# Patient Record
Sex: MALE | Race: WHITE | Employment: UNEMPLOYED | URBAN - METROPOLITAN AREA
[De-identification: names, ages, dates, MRNs, and addresses within clinical notes are randomized per-mention and may not be internally consistent; named-entity substitution may affect disease eponyms.]

---

## 2021-03-25 ENCOUNTER — IMMUNIZATION (OUTPATIENT)
Dept: PRIMARY CARE CLINIC | Age: 26
End: 2021-03-25
Payer: COMMERCIAL

## 2021-03-25 PROCEDURE — 91300 COVID-19, PFIZER VACCINE 30MCG/0.3ML DOSE: CPT | Performed by: PHYSICIAN ASSISTANT

## 2021-03-25 PROCEDURE — 0001A COVID-19, PFIZER VACCINE 30MCG/0.3ML DOSE: CPT | Performed by: PHYSICIAN ASSISTANT

## 2021-04-16 ENCOUNTER — IMMUNIZATION (OUTPATIENT)
Dept: PRIMARY CARE CLINIC | Age: 26
End: 2021-04-16
Payer: COMMERCIAL

## 2021-04-16 PROCEDURE — 91300 COVID-19, PFIZER VACCINE 30MCG/0.3ML DOSE: CPT | Performed by: INTERNAL MEDICINE

## 2021-04-16 PROCEDURE — 0002A COVID-19, PFIZER VACCINE 30MCG/0.3ML DOSE: CPT | Performed by: INTERNAL MEDICINE

## 2022-02-07 ENCOUNTER — HOSPITAL ENCOUNTER (OUTPATIENT)
Age: 27
Discharge: HOME | End: 2022-02-09
Payer: COMMERCIAL

## 2022-02-07 ENCOUNTER — HOSPITAL ENCOUNTER (OUTPATIENT)
Dept: GENERAL RADIOLOGY | Age: 27
Discharge: HOME | End: 2022-02-09
Payer: COMMERCIAL

## 2022-02-07 DIAGNOSIS — M25.511 ACUTE PAIN OF RIGHT SHOULDER: ICD-10-CM

## 2022-02-07 PROCEDURE — 73030 X-RAY EXAM OF SHOULDER: CPT

## 2022-04-06 ENCOUNTER — OFFICE VISIT (OUTPATIENT)
Dept: ORTHOPEDIC SURGERY | Age: 27
End: 2022-04-06
Payer: COMMERCIAL

## 2022-04-06 VITALS — BODY MASS INDEX: 25.76 KG/M2 | WEIGHT: 170 LBS | HEIGHT: 68 IN

## 2022-04-06 DIAGNOSIS — S43.431A GLENOID LABRAL TEAR, RIGHT, INITIAL ENCOUNTER: Primary | ICD-10-CM

## 2022-04-06 PROCEDURE — G8427 DOCREV CUR MEDS BY ELIG CLIN: HCPCS | Performed by: ORTHOPAEDIC SURGERY

## 2022-04-06 PROCEDURE — 1036F TOBACCO NON-USER: CPT | Performed by: ORTHOPAEDIC SURGERY

## 2022-04-06 PROCEDURE — G8419 CALC BMI OUT NRM PARAM NOF/U: HCPCS | Performed by: ORTHOPAEDIC SURGERY

## 2022-04-06 PROCEDURE — 99203 OFFICE O/P NEW LOW 30 MIN: CPT | Performed by: ORTHOPAEDIC SURGERY

## 2022-04-06 NOTE — PROGRESS NOTES
Chief Complaint   Patient presents with    Shoulder Pain         HPI:    Patient is 32 y.o. male complaining of right shoulder pain and weakness for about 5 months. He admits to a specific traumatic injury to the right shoulder. He felt a pop while doing some heavy shovelling. Previous treatments include rest, ice, and anti-inflammatory medication and about 2 months of formal physical therapy without much relief. He denies any other orthopedic complaints. ROS:    Skin: (-) rash,(-) psoriasis,(-) eczema, (-)skin cancer. Neurologic: (-)numbness, (-)tingling, (-)headaches, (-) LOC. Cardiovascular: (-) Chest pain, (-) swelling in legs/feet, (-) SOB, (-) cramping in legs/feet with walking. All other review of systems negative except stated above or in HPI      Past Medical History:   Diagnosis Date    Anal fissure 2015    not correct diagnosis    Anxiety     in medical setting,     Pilonidal cyst      Past Surgical History:   Procedure Laterality Date    PILONIDAL CYST EXCISION  11/06/2020    Dr. Vincenzo Ackerman EXTRACTION  2012     No current outpatient medications on file.   Allergies   Allergen Reactions    Menthol Hives     Social History     Socioeconomic History    Marital status: Single     Spouse name: Not on file    Number of children: Not on file    Years of education: Not on file    Highest education level: Not on file   Occupational History    Not on file   Tobacco Use    Smoking status: Never Smoker    Smokeless tobacco: Never Used   Vaping Use    Vaping Use: Never used   Substance and Sexual Activity    Alcohol use: Not Currently    Drug use: Yes     Frequency: 2.0 times per week     Types: Marijuana Josefriedsarah Chamberlain)    Sexual activity: Not Currently   Other Topics Concern    Not on file   Social History Narrative    Not on file     Social Determinants of Health     Financial Resource Strain:     Difficulty of Paying Living Expenses: Not on file   Food Insecurity:     Worried About Running Out of Food in the Last Year: Not on file    Ran Out of Food in the Last Year: Not on file   Transportation Needs:     Lack of Transportation (Medical): Not on file    Lack of Transportation (Non-Medical): Not on file   Physical Activity:     Days of Exercise per Week: Not on file    Minutes of Exercise per Session: Not on file   Stress:     Feeling of Stress : Not on file   Social Connections:     Frequency of Communication with Friends and Family: Not on file    Frequency of Social Gatherings with Friends and Family: Not on file    Attends Spiritism Services: Not on file    Active Member of [address] [place] [address] [employer] or Organizations: Not on file    Attends Club or Organization Meetings: Not on file    Marital Status: Not on file   Intimate Partner Violence:     Fear of Current or Ex-Partner: Not on file    Emotionally Abused: Not on file    Physically Abused: Not on file    Sexually Abused: Not on file   Housing Stability:     Unable to Pay for Housing in the Last Year: Not on file    Number of [place] in the Last Year: Not on file    Unstable Housing in the Last Year: Not on file     Family History   Problem Relation Age of Onset    Colon Cancer Maternal Grandfather         52's    Colon Cancer Paternal Grandfather         66's           Physical Exam:    Ht 5' 8\" (1.727 m)   Wt 170 lb (77.1 kg)   BMI 25.85 kg/m²     GENERAL: alert, appears stated age, cooperative, no acute distress    HEENT: Head is normocephalic, atraumatic. PERRLA. SKIN: Clean, dry, intact. There is not any cellulitis or cutaneous lesions noted in the upper extremities    PULMONARY: breathing is regular and unlabored, no acute distress    CV: The bilateral upper and lower extremities are warm and well-perfused with brisk capillary refill. 2+ pulses UE and LE bilateral.     PSYCHIATRY: Pleasant mood, appropriate behavior, follows commands    NEURO: Sensation is intact distally with light touch with no alteration.  Motor exam of the upper extremities show elbow flexion and extension, wrist flexion and extension, and finger abduction grossly intact 5/5. Upper extremity reflexes are bilaterally symmetrical and within normal limits. LYMPH: No lymphedema present distally in upper or lower extremity. MUSCULOSKELETAL:  Shoulder Exam:  Examination of the right shoulder shows: There is not a deformity. There is not erythema. There is not soft tissue swelling. Deltoid region is  tender to palpation. AC Joint is  tender to palpation. Clavicle is not tender to palpation. Bicipital Groove is  tender to palpation. Pectoralis  is not tender to palpation. Scapula/ trapezius is  tender to palpation.   Left:  ROM Full, Strength: Supraspinatus 5/5, Infraspinatus 5/5, Subscapularis 5/5  Right:  ROM 90/90/60, Strength: Supraspinatus 4/5, Infraspinatus 5/5, Subscapularis 5/5  Left Shoulder:  Crepitus:  no   Tenderness:  none   Effusion:   none   Impingement: negative   Empty Can:  negative   Speed's:  negative      Apprehension:  negative   Cross Arm Sign:  negative   [place]aston's:  negative       Neer's:  negative      Belly Press Test:  negative      Drop Arm Test:  negative     Right Shoulder:  Crepitus:  no   Tenderness:  mild   Effusion:   non   Impingement: positive   Empty Can:  positive   Speed's: positive   Apprehension:  not tested   Cross Arm Sign:  positive   [place]'s:  positive      Neer's:  positive      Belly Press Test:  negative   Drop Arm Test:  positive           Imaging:  EXAMINATION:   THREE XRAY VIEWS OF THE RIGHT SHOULDER       2/7/2022 2:46 pm       COMPARISON:   None.       HISTORY:   ORDERING SYSTEM PROVIDED HISTORY: Acute pain of right shoulder       FINDINGS:   Glenohumeral joint is normally aligned.  No evidence of acute fracture or   dislocation.  No abnormal periarticular calcifications.  The AC joint is   unremarkable in appearance.       Visualized lung is unremarkable.           Impression   No acute abnormality. Solitario Williamson was seen today for shoulder pain. Diagnoses and all orders for this visit:    Glenoid labral tear, right, initial encounter  -     MRI SHOULDER RIGHT W CONTRAST; Future  -     IR INJ ARTHROGRAM SHOULDER; Future        Patient seen and examined. X-rays reviewed. Patient has exam and history consistent with rotator cuff pathology. MRI recommended for further evaluation and management of possible rotator cuff tear.   Return to clinic after DO Anjelica  4/6/22

## 2022-04-20 ENCOUNTER — HOSPITAL ENCOUNTER (OUTPATIENT)
Dept: INTERVENTIONAL RADIOLOGY/VASCULAR | Age: 27
Discharge: HOME | End: 2022-04-20
Payer: COMMERCIAL

## 2022-04-20 ENCOUNTER — HOSPITAL ENCOUNTER (OUTPATIENT)
Dept: MRI IMAGING | Age: 27
Discharge: HOME | End: 2022-04-22
Payer: COMMERCIAL

## 2022-04-20 DIAGNOSIS — S43.431A GLENOID LABRAL TEAR, RIGHT, INITIAL ENCOUNTER: ICD-10-CM

## 2022-04-20 PROCEDURE — 73222 MRI JOINT UPR EXTREM W/DYE: CPT

## 2022-04-20 PROCEDURE — 6360000004 HC RX CONTRAST MEDICATION

## 2022-04-20 PROCEDURE — 20610 DRAIN/INJ JOINT/BURSA W/O US: CPT

## 2022-04-20 PROCEDURE — 77002 NEEDLE LOCALIZATION BY XRAY: CPT

## 2022-04-20 PROCEDURE — A9577 INJ MULTIHANCE: HCPCS | Performed by: RADIOLOGY

## 2022-04-20 PROCEDURE — 6360000004 HC RX CONTRAST MEDICATION: Performed by: RADIOLOGY

## 2022-04-21 ENCOUNTER — OFFICE VISIT (OUTPATIENT)
Dept: ORTHOPEDIC SURGERY | Age: 27
End: 2022-04-21
Payer: COMMERCIAL

## 2022-04-21 VITALS — BODY MASS INDEX: 25.76 KG/M2 | HEIGHT: 68 IN | WEIGHT: 170 LBS

## 2022-04-21 DIAGNOSIS — Q74.0 BUFORD COMPLEX OF RIGHT SHOULDER: Primary | ICD-10-CM

## 2022-04-21 DIAGNOSIS — G25.89 SCAPULAR DYSKINESIS: ICD-10-CM

## 2022-04-21 DIAGNOSIS — S43.401A SPRAIN OF RIGHT SHOULDER, UNSPECIFIED SHOULDER SPRAIN TYPE, INITIAL ENCOUNTER: ICD-10-CM

## 2022-04-21 DIAGNOSIS — S46.911A STRAIN OF RIGHT SHOULDER, INITIAL ENCOUNTER: ICD-10-CM

## 2022-04-21 PROCEDURE — G8427 DOCREV CUR MEDS BY ELIG CLIN: HCPCS | Performed by: ORTHOPAEDIC SURGERY

## 2022-04-21 PROCEDURE — G8419 CALC BMI OUT NRM PARAM NOF/U: HCPCS | Performed by: ORTHOPAEDIC SURGERY

## 2022-04-21 PROCEDURE — 99214 OFFICE O/P EST MOD 30 MIN: CPT | Performed by: ORTHOPAEDIC SURGERY

## 2022-04-21 PROCEDURE — 1036F TOBACCO NON-USER: CPT | Performed by: ORTHOPAEDIC SURGERY

## 2022-04-21 NOTE — PROGRESS NOTES
Chief Complaint   Patient presents with    Shoulder Pain     Right shoulder MRI follow up         HPI:    Patient is 32 y.o. male complaining of right shoulder pain and weakness for about 5 months. He admits to a specific traumatic injury to the right shoulder. He felt a pop while doing some heavy shovelling. Previous treatments include rest, ice, and anti-inflammatory medication and about 2 months of formal physical therapy without much relief. He denies any other orthopedic complaints. Follows up after MRI. ROS:    Skin: (-) rash,(-) psoriasis,(-) eczema, (-)skin cancer. Neurologic: (-)numbness, (-)tingling, (-)headaches, (-) LOC. Cardiovascular: (-) Chest pain, (-) swelling in legs/feet, (-) SOB, (-) cramping in legs/feet with walking. All other review of systems negative except stated above or in HPI      Past Medical History:   Diagnosis Date    Anal fissure 2015    not correct diagnosis    Anxiety     in medical setting,     Pilonidal cyst      Past Surgical History:   Procedure Laterality Date    PILONIDAL CYST EXCISION  11/06/2020    Dr. Lacy Wood EXTRACTION 2012     No current outpatient medications on file.   Allergies   Allergen Reactions    Menthol Hives     Social History     Socioeconomic History    Marital status: Single     Spouse name: Not on file    Number of children: Not on file    Years of education: Not on file    Highest education level: Not on file   Occupational History    Not on file   Tobacco Use    Smoking status: Never Smoker    Smokeless tobacco: Never Used   Vaping Use    Vaping Use: Never used   Substance and Sexual Activity    Alcohol use: Not Currently    Drug use: Yes     Frequency: 2.0 times per week     Types: Marijuana Lolislatomas Baumann)    Sexual activity: Not Currently   Other Topics Concern    Not on file   Social History Narrative    Not on file     Social Determinants of Health     Financial Resource Strain:     Difficulty of Paying Living Expenses: Not on file   Food Insecurity:     Worried About Running Out of Food in the Last Year: Not on file    Jj of Food in the Last Year: Not on file   Transportation Needs:     Lack of Transportation (Medical): Not on file    Lack of Transportation (Non-Medical): Not on file   Physical Activity:     Days of Exercise per Week: Not on file    Minutes of Exercise per Session: Not on file   Stress:     Feeling of Stress : Not on file   Social Connections:     Frequency of Communication with Friends and Family: Not on file    Frequency of Social Gatherings with Friends and Family: Not on file    Attends Hinduism Services: Not on file    Active Member of [address] [place] [address] [employer] or Organizations: Not on file    Attends Club or Organization Meetings: Not on file    Marital Status: Not on file   Intimate Partner Violence:     Fear of Current or Ex-Partner: Not on file    Emotionally Abused: Not on file    Physically Abused: Not on file    Sexually Abused: Not on file   Housing Stability:     Unable to Pay for Housing in the Last Year: Not on file    Number of [place] in the Last Year: Not on file    Unstable Housing in the Last Year: Not on file     Family History   Problem Relation Age of Onset    Colon Cancer Maternal Grandfather         52's    Colon Cancer Paternal Grandfather         66's           Physical Exam:    Ht 5' 8\" (1.727 m)   Wt 170 lb (77.1 kg)   BMI 25.85 kg/m²     GENERAL: alert, appears stated age, cooperative, no acute distress    HEENT: Head is normocephalic, atraumatic. PERRLA. SKIN: Clean, dry, intact. There is not any cellulitis or cutaneous lesions noted in the lower extremities     PULMONARY: breathing is regular and unlabored, no acute distress     CV: The bilateral lower extremities are warm and well-perfused with brisk capillary refill.  2+ pulses LE bilateral.     ABDOMINAL: Nontender, nondistended     PSYCHIATRY: Pleasant mood, appropriate behavior, follows commands NEURO: Sensation is intact distally with light touch with no alteration. Motor exam of the lower extremities show quadriceps, hamstrings, foot dorsiflexion and plantarflexion grossly intact 5/5. LYMPH: No lymphedema present distally in upper or lower extremity. MUSCULOSKELETAL:  Shoulder Exam:  Examination of the right shoulder shows: There is not a deformity. There is not erythema. There is not soft tissue swelling. Deltoid region is  tender to palpation. AC Joint is  tender to palpation. Clavicle is not tender to palpation. Bicipital Groove is  tender to palpation. Pectoralis  is not tender to palpation. Scapula/ trapezius is  tender to palpation.   Left:  ROM Full, Strength: Supraspinatus 5/5, Infraspinatus 5/5, Subscapularis 5/5  Right:  /140/60, Strength: Supraspinatus 4/5, Infraspinatus 5/5, Subscapularis 5/5  Left Shoulder:  Crepitus:  no   Tenderness:  none   Effusion:   none   Impingement: negative   Empty Can:  negative   Speed's:  negative      Apprehension:  negative   Cross Arm Sign:  negative   [place]'s:  negative       Neer's:  negative      Belly Press Test:  negative      Drop Arm Test:  negative     Right Shoulder:  Crepitus:  no   Tenderness:  mild   Effusion:   non   Impingement: positive   Empty Can:  positive   Speed's: positive   Apprehension:  not tested   Cross Arm Sign:  positive   [place]'s:  positive      Neer's:  positive      Belly Press Test:  negative   Drop Arm Test:  positive     Mild improvement since last visit      Imaging:  EXAMINATION:   THREE XRAY VIEWS OF THE RIGHT SHOULDER       2/7/2022 2:46 pm       COMPARISON:   None.       HISTORY:   ORDERING SYSTEM PROVIDED HISTORY: Acute pain of right shoulder       FINDINGS:   Glenohumeral joint is normally aligned.  No evidence of acute fracture or   dislocation.  No abnormal periarticular calcifications.  The AC joint is   unremarkable in appearance.       Visualized lung is unremarkable.         Impression   No acute abnormality. IR FLUORO GUIDED NEEDLE PLACEMENT    Result Date: 4/20/2022  EXAMINATION: FLUOROSCOPIC GUIDED INJECTION OF THE right shoulder 4/20/2022 10:48 am HISTORY: ORDERING SYSTEM PROVIDED HISTORY: Glenoid labral tear, right, initial encounter FLUOROSCOPY DOSE AND TYPE OR TIME AND EXPOSURES: Fluoroscopy time equals 3.2 minutes. Total dose equals D AP 3082 PROCEDURE: :  Tito Cosme MD Informed consent was obtained and universal protocol was observed. Under standard sterile condition, local anesthesia with subcutaneous 2% lidocaine was administered. A skin entry site was selected with fluoroscopy. A 22 gauge spinal needle was inserted into the right shoulder joint under fluoroscopic guidance. Approximately 0.5 ml of iodinated contrast was injected into the joint to confirm location. Subsequently, 9 cc of dilute MultiHance MR contrast was injected into the right glenohumeral joint prior to MRI of the right shoulder. Successful fluoroscopic-guided MR arthrogram prior to MRI of the right shoulder. Reji Puckett MRI SHOULDER RIGHT W CONTRAST    Result Date: 4/20/2022  EXAMINATION: MRI ARTHROGRAM OF THE RIGHT SHOULDER   4/20/2022 11:02 am TECHNIQUE: Multiplanar multisequence MRI of the right shoulder was performed after the administration of intra-articular contrast. COMPARISON: None. HISTORY: ORDERING SYSTEM PROVIDED HISTORY: Glenoid labral tear, right, initial encounter TECHNOLOGIST PROVIDED HISTORY: STAT Creatinine as needed:->No FINDINGS: ROTATOR CUFF: Intact supraspinatus, infraspinatus, subscapularis and teres minor tendons. No significant muscle edema or atrophy. Intrasubstance signal within the subscapularis tendon related to injection. BICEPS TENDON: Intact vertical and horizontal portions of the long head of the biceps tendon. LABRUM: Tear of the anterior superior and anterior labrum with some irregularity of the anterior inferior labrum.   Superimposed Brock anatomy is possible. Tear is best seen on page 14 of series 3. possible intraligamentous extension of a large anterior labral tear. GLENOHUMERAL JOINT: Intra-articular contrast.  No subchondral cysts or evidence of high-grade cartilage loss. Unremarkable alignment. AC JOINT AND ACROMIOCLAVICULAR ARCH: No significant acromial downsloping or subacromial spur. No significant degenerative changes. Intact acromioclavicular and coracoclavicular ligaments. BONE MARROW: No evidence of fracture. Normal marrow signal. OUTLET SPACES: No significant subacromial/subdeltoid bursitis. Normal MRI appearance of the quadrilateral space. No significant narrowing of the supraspinatus outlet. Mild fraying with tear of the anterior inferior labrum (page 14 of series 3) adjacent to the middle glenohumeral ligament. There are findings favoring superimposed Brock anatomy versus larger more extensive tear of the anterior labrum with intraligamentous extension. Solitario Williamson was seen today for shoulder pain. Diagnoses and all orders for this visit:    [place] complex of right shoulder    Strain of right shoulder, initial encounter    Sprain of right shoulder, unspecified shoulder sprain type, initial encounter    Scapular dyskinesis        Patient seen and examined. X-rays reviewed. Patient has exam and history consistent with rotator cuff pathology. MRI recommended for further evaluation and management of possible rotator cuff tear. MRI reviewed with patient in detail. Natural history and course discussed with patient in long discussion  Treatment options discussed with patient in detail including risks and benefits. Patient should do well with conservative management as patient would like to avoid surgery at this time. Patient does have a [place] complex noted on MRI without any history of dislocation. Patient has no instability and also complains of more pain closer to the triceps. Patient may experiencing more scapular dyskinesia. Patient will continue with physical therapy. In a 15 minute assessment and discussion, patient was counseled on weight loss, healthy diet, and physical activity relating to this condition. He was educated with options in detail including nutrition, joining a health club/ weight loss program, and use of cardio equipment such as the Arc Trainer and the importance of use as well as range of motion and HEP exercises for weight loss and general health. Jan Garcia DO          25 minutes was spent with patient. 50% or greater was spent counseling the patient.

## 2022-05-24 ENCOUNTER — OFFICE VISIT (OUTPATIENT)
Dept: ORTHOPEDIC SURGERY | Age: 27
End: 2022-05-24
Payer: COMMERCIAL

## 2022-05-24 VITALS — TEMPERATURE: 98 F | HEIGHT: 68 IN | WEIGHT: 170 LBS | BODY MASS INDEX: 25.76 KG/M2

## 2022-05-24 DIAGNOSIS — S46.911D STRAIN OF RIGHT SHOULDER, SUBSEQUENT ENCOUNTER: ICD-10-CM

## 2022-05-24 DIAGNOSIS — Q74.0 BUFORD COMPLEX OF RIGHT SHOULDER: Primary | ICD-10-CM

## 2022-05-24 DIAGNOSIS — G25.89 SCAPULAR DYSKINESIS: ICD-10-CM

## 2022-05-24 DIAGNOSIS — S43.401D SPRAIN OF RIGHT SHOULDER, UNSPECIFIED SHOULDER SPRAIN TYPE, SUBSEQUENT ENCOUNTER: ICD-10-CM

## 2022-05-24 PROCEDURE — G8419 CALC BMI OUT NRM PARAM NOF/U: HCPCS | Performed by: ORTHOPAEDIC SURGERY

## 2022-05-24 PROCEDURE — 99214 OFFICE O/P EST MOD 30 MIN: CPT | Performed by: ORTHOPAEDIC SURGERY

## 2022-05-24 PROCEDURE — 1036F TOBACCO NON-USER: CPT | Performed by: ORTHOPAEDIC SURGERY

## 2022-05-24 PROCEDURE — G8427 DOCREV CUR MEDS BY ELIG CLIN: HCPCS | Performed by: ORTHOPAEDIC SURGERY

## 2022-05-24 NOTE — PROGRESS NOTES
Chief Complaint   Patient presents with    Shoulder Pain     right shoulder pain f/u says it has improved some         HPI:    Patient is 32 y.o. male complaining of right shoulder pain and weakness for about 5 months. He admits to a specific traumatic injury to the right shoulder. He felt a pop while doing some heavy shovelling. Previous treatments include rest, ice, and anti-inflammatory medication and about 2 months of formal physical therapy without much relief. He denies any other orthopedic complaints. Follows up after for recheck stating he has some mild to moderate improvement. ROS:    Skin: (-) rash,(-) psoriasis,(-) eczema, (-)skin cancer. Neurologic: (-)numbness, (-)tingling, (-)headaches, (-) LOC. Cardiovascular: (-) Chest pain, (-) swelling in legs/feet, (-) SOB, (-) cramping in legs/feet with walking. All other review of systems negative except stated above or in HPI      Past Medical History:   Diagnosis Date    Anal fissure 2015    not correct diagnosis    Anxiety     in medical setting,     Pilonidal cyst      Past Surgical History:   Procedure Laterality Date    PILONIDAL CYST EXCISION  11/06/2020    Dr. Romero Duran EXTRACTION  2012     No current outpatient medications on file.   Allergies   Allergen Reactions    Menthol Hives     Social History     Socioeconomic History    Marital status: Single     Spouse name: Not on file    Number of children: Not on file    Years of education: Not on file    Highest education level: Not on file   Occupational History    Not on file   Tobacco Use    Smoking status: Never Smoker    Smokeless tobacco: Never Used   Vaping Use    Vaping Use: Never used   Substance and Sexual Activity    Alcohol use: Not Currently    Drug use: Yes     Frequency: 2.0 times per week     Types: Marijuana Fronie Hiram)    Sexual activity: Not Currently   Other Topics Concern    Not on file   Social History Narrative    Not on file     Social Determinants of Health     Financial Resource Strain:     Difficulty of Paying Living Expenses: Not on file   Food Insecurity:     Worried About Running Out of Food in the Last Year: Not on file    Jj of Food in the Last Year: Not on file   Transportation Needs:     Lack of Transportation (Medical): Not on file    Lack of Transportation (Non-Medical): Not on file   Physical Activity:     Days of Exercise per Week: Not on file    Minutes of Exercise per Session: Not on file   Stress:     Feeling of Stress : Not on file   Social Connections:     Frequency of Communication with Friends and Family: Not on file    Frequency of Social Gatherings with Friends and Family: Not on file    Attends Mandaen Services: Not on file    Active Member of [address] [place] [address] [employer] or Organizations: Not on file    Attends Club or Organization Meetings: Not on file    Marital Status: Not on file   Intimate Partner Violence:     Fear of Current or Ex-Partner: Not on file    Emotionally Abused: Not on file    Physically Abused: Not on file    Sexually Abused: Not on file   Housing Stability:     Unable to Pay for Housing in the Last Year: Not on file    Number of [place] in the Last Year: Not on file    Unstable Housing in the Last Year: Not on file     Family History   Problem Relation Age of Onset    Colon Cancer Maternal Grandfather         52's    Colon Cancer Paternal Grandfather         66's           Physical Exam:    Temp 98 °F (36.7 °C)   Ht 5' 8\" (1.727 m)   Wt 170 lb (77.1 kg)   BMI 25.85 kg/m²     GENERAL: alert, appears stated age, cooperative, no acute distress    HEENT: Head is normocephalic, atraumatic. PERRLA. SKIN: Clean, dry, intact. There is not any cellulitis or cutaneous lesions noted in the lower extremities     PULMONARY: breathing is regular and unlabored, no acute distress     CV: The bilateral lower extremities are warm and well-perfused with brisk capillary refill.  2+ pulses LE bilateral.     ABDOMINAL: Nontender, nondistended     PSYCHIATRY: Pleasant mood, appropriate behavior, follows commands     NEURO: Sensation is intact distally with light touch with no alteration. Motor exam of the lower extremities show quadriceps, hamstrings, foot dorsiflexion and plantarflexion grossly intact 5/5. LYMPH: No lymphedema present distally in upper or lower extremity. MUSCULOSKELETAL:  Shoulder Exam:  Examination of the right shoulder shows: There is not a deformity. There is not erythema. There is not soft tissue swelling. Deltoid region is  tender to palpation. AC Joint is  tender to palpation. Clavicle is not tender to palpation. Bicipital Groove is  tender to palpation. Pectoralis  is not tender to palpation. Scapula/ trapezius is  tender to palpation.   Left:  ROM Full, Strength: Supraspinatus 5/5, Infraspinatus 5/5, Subscapularis 5/5  Right:  /140/60, Strength: Supraspinatus 4/5, Infraspinatus 5/5, Subscapularis 5/5  Left Shoulder:  Crepitus:  no   Tenderness:  none   Effusion:   none   Impingement: negative   Empty Can:  negative   Speed's:  negative      Apprehension:  negative   Cross Arm Sign:  negative   [place]'s:  negative       Neer's:  negative      Belly Press Test:  negative      Drop Arm Test:  negative     Right Shoulder:  Crepitus:  no   Tenderness:  mild   Effusion:   non   Impingement: positive   Empty Can:  positive   Speed's: positive   Apprehension:  not tested   Cross Arm Sign:  positive   [place]'s:  positive      Neer's:  positive      Belly Press Test:  negative   Drop Arm Test:  positive     Mild improvement since last visit      Imaging:  EXAMINATION:   THREE XRAY VIEWS OF THE RIGHT SHOULDER       2/7/2022 2:46 pm       COMPARISON:   None.       HISTORY:   ORDERING SYSTEM PROVIDED HISTORY: Acute pain of right shoulder       FINDINGS:   Glenohumeral joint is normally aligned.  No evidence of acute fracture or   dislocation.  No abnormal periarticular calcifications.  The AC joint is   unremarkable in appearance.       Visualized lung is unremarkable.           Impression   No acute abnormality. IR FLUORO GUIDED NEEDLE PLACEMENT    Result Date: 4/20/2022  EXAMINATION: FLUOROSCOPIC GUIDED INJECTION OF THE right shoulder 4/20/2022 10:48 am HISTORY: ORDERING SYSTEM PROVIDED HISTORY: Glenoid labral tear, right, initial encounter FLUOROSCOPY DOSE AND TYPE OR TIME AND EXPOSURES: Fluoroscopy time equals 3.2 minutes. Total dose equals D AP 3082 PROCEDURE: :  Celeste Mcgovernr, MD Informed consent was obtained and universal protocol was observed. Under standard sterile condition, local anesthesia with subcutaneous 2% lidocaine was administered. A skin entry site was selected with fluoroscopy. A 22 gauge spinal needle was inserted into the right shoulder joint under fluoroscopic guidance. Approximately 0.5 ml of iodinated contrast was injected into the joint to confirm location. Subsequently, 9 cc of dilute MultiHance MR contrast was injected into the right glenohumeral joint prior to MRI of the right shoulder. Successful fluoroscopic-guided MR arthrogram prior to MRI of the right shoulder. Eh Mccormack MRI SHOULDER RIGHT W CONTRAST    Result Date: 4/20/2022  EXAMINATION: MRI ARTHROGRAM OF THE RIGHT SHOULDER   4/20/2022 11:02 am TECHNIQUE: Multiplanar multisequence MRI of the right shoulder was performed after the administration of intra-articular contrast. COMPARISON: None. HISTORY: ORDERING SYSTEM PROVIDED HISTORY: Glenoid labral tear, right, initial encounter TECHNOLOGIST PROVIDED HISTORY: STAT Creatinine as needed:->No FINDINGS: ROTATOR CUFF: Intact supraspinatus, infraspinatus, subscapularis and teres minor tendons. No significant muscle edema or atrophy. Intrasubstance signal within the subscapularis tendon related to injection. BICEPS TENDON: Intact vertical and horizontal portions of the long head of the biceps tendon.  LABRUM: Tear of the anterior superior and anterior labrum with some irregularity of the anterior inferior labrum. Superimposed Wall anatomy is possible. Tear is best seen on page 14 of series 3. possible intraligamentous extension of a large anterior labral tear. GLENOHUMERAL JOINT: Intra-articular contrast.  No subchondral cysts or evidence of high-grade cartilage loss. Unremarkable alignment. AC JOINT AND ACROMIOCLAVICULAR ARCH: No significant acromial downsloping or subacromial spur. No significant degenerative changes. Intact acromioclavicular and coracoclavicular ligaments. BONE MARROW: No evidence of fracture. Normal marrow signal. OUTLET SPACES: No significant subacromial/subdeltoid bursitis. Normal MRI appearance of the quadrilateral space. No significant narrowing of the supraspinatus outlet. Mild fraying with tear of the anterior inferior labrum (page 14 of series 3) adjacent to the middle glenohumeral ligament. There are findings favoring superimposed Wall anatomy versus larger more extensive tear of the anterior labrum with intraligamentous extension. Victor Hugo Hernandez was seen today for shoulder pain. Diagnoses and all orders for this visit:    Wall complex of right shoulder    Strain of right shoulder, subsequent encounter    Sprain of right shoulder, unspecified shoulder sprain type, subsequent encounter    Scapular dyskinesis        Patient seen and examined. X-rays reviewed. Patient has exam and history consistent with rotator cuff pathology. MRI recommended for further evaluation and management of possible rotator cuff tear. MRI reviewed with patient in detail. Natural history and course discussed with patient in long discussion  Treatment options discussed with patient in detail including risks and benefits. Patient should do well with conservative management as patient would like to avoid surgery at this time. Patient does have a Brock complex noted on MRI without any history of dislocation.   Patient has no instability and also complains of more pain closer to the triceps. Patient may experiencing more scapular dyskinesia. Patient will continue with physical therapy. In a 15 minute assessment and discussion, patient was counseled on weight loss, healthy diet, and physical activity relating to this condition. He was educated with options in detail including nutrition, joining a health club/ weight loss program, and use of cardio equipment such as the Arc Trainer and the importance of use as well as range of motion and HEP exercises for weight loss and general health. Charlette Cunningham DO          25 minutes was spent with patient. 50% or greater was spent counseling the patient.